# Patient Record
Sex: MALE | Race: WHITE | NOT HISPANIC OR LATINO | URBAN - METROPOLITAN AREA
[De-identification: names, ages, dates, MRNs, and addresses within clinical notes are randomized per-mention and may not be internally consistent; named-entity substitution may affect disease eponyms.]

---

## 2023-04-12 ENCOUNTER — TELEMEDICINE (OUTPATIENT)
Dept: PRIMARY CARE | Facility: CLINIC | Age: 28
End: 2023-04-12
Payer: COMMERCIAL

## 2023-04-12 DIAGNOSIS — U07.1 COVID-19 VIRUS INFECTION: Primary | ICD-10-CM

## 2023-04-12 DIAGNOSIS — R05.1 ACUTE COUGH: ICD-10-CM

## 2023-04-12 DIAGNOSIS — J01.90 ACUTE SINUSITIS, RECURRENCE NOT SPECIFIED, UNSPECIFIED LOCATION: ICD-10-CM

## 2023-04-12 PROCEDURE — 99442 PR PHYS/QHP TELEPHONE EVALUATION 11-20 MIN: CPT | Performed by: INTERNAL MEDICINE

## 2023-04-12 NOTE — PROGRESS NOTES
Subjective   Patient ID: Royce Sullivan is a 28 y.o. male who presents for No chief complaint on file..    HPI patient initiated telehealth visit this visit was completed via telephone secondary to the restrictions of the COVID-19 pandemic all medical issues were addressed and discussed with patient patient was not otherwise examined if it was felt that the patient needed to be seen in the office they would have been referred to do so patient verbally consented to visit  Sick visit had tested positive for COVID today after 1 to 2 days of symptoms consisting of Mm aches and fever (100-101 dF) Father had covid over the past several days and before patient knew that he had spent time with him  No cp, sob no side effect with medication    Past medical history Allergic rhinitis    Medication Finesteride lexapro minoxidil    Allergies nkda      Review of Systems    Objective   There were no vitals taken for this visit.    Physical Exam alert and oriented x3 breathing unlabored not otherwise examined    Assessment/Plan  impression covid sinus cough  Plan tylenol may be used for fever suppression okay for sinus medications such as Zyrtec if needed or cough syrup or lozenges for the cough continue with other medications as needed good nutrition proper rest okay for Paxlovid 3 tablets in the morning 3 tablets in the evening take as directed see EMR Increased hydration 5 days of masking 5 days of isolation prior and recheck if no better and for regular visit

## 2023-10-14 PROBLEM — R63.4 WEIGHT LOSS: Status: ACTIVE | Noted: 2023-10-14

## 2023-10-14 PROBLEM — D23.72 OTHER BENIGN NEOPLASM OF SKIN OF LEFT LOWER LIMB, INCLUDING HIP: Status: ACTIVE | Noted: 2023-08-01

## 2023-10-14 PROBLEM — G93.5 CHIARI I MALFORMATION (MULTI): Status: ACTIVE | Noted: 2023-10-14

## 2023-10-14 PROBLEM — R79.89 ELEVATED LIVER FUNCTION TESTS: Status: ACTIVE | Noted: 2023-10-14

## 2023-10-14 PROBLEM — F41.8 DEPRESSION WITH ANXIETY: Status: ACTIVE | Noted: 2023-10-14

## 2023-10-14 PROBLEM — Z86.69 HISTORY OF CHIARI MALFORMATION: Status: ACTIVE | Noted: 2023-10-14

## 2023-10-14 PROBLEM — R06.00 DYSPNEA: Status: ACTIVE | Noted: 2023-10-14

## 2023-10-14 PROBLEM — L21.8 OTHER SEBORRHEIC DERMATITIS: Status: ACTIVE | Noted: 2023-08-01

## 2023-10-14 PROBLEM — L30.9 DERMATITIS, UNSPECIFIED: Status: ACTIVE | Noted: 2023-08-01

## 2023-10-14 PROBLEM — Q05.9 MENINGOCELE (MULTI): Status: ACTIVE | Noted: 2023-10-14

## 2023-10-14 PROBLEM — F41.9 ANXIETY: Status: ACTIVE | Noted: 2023-10-14

## 2023-10-14 PROBLEM — L20.89 OTHER ATOPIC DERMATITIS: Status: ACTIVE | Noted: 2023-08-01

## 2023-10-14 PROBLEM — L28.1 PRURIGO NODULARIS: Status: ACTIVE | Noted: 2023-08-01

## 2023-10-14 PROBLEM — R00.2 PALPITATIONS: Status: ACTIVE | Noted: 2023-10-14

## 2023-10-14 PROBLEM — E78.49 ESSENTIAL FAMILIAL HYPERLIPIDEMIA: Status: ACTIVE | Noted: 2023-10-14

## 2023-10-14 PROBLEM — H54.7 VISUAL PROBLEMS: Status: ACTIVE | Noted: 2023-10-14

## 2023-10-14 PROBLEM — B07.0 PLANTAR WART: Status: ACTIVE | Noted: 2023-08-01

## 2023-10-14 PROBLEM — H52.13 BILATERAL MYOPIA: Status: ACTIVE | Noted: 2023-10-14

## 2023-10-14 PROBLEM — D48.5 NEOPLASM OF UNCERTAIN BEHAVIOR OF SKIN: Status: ACTIVE | Noted: 2023-08-01

## 2023-10-14 PROBLEM — D38.5: Status: ACTIVE | Noted: 2023-10-14

## 2023-10-14 PROBLEM — G47.00 INSOMNIA: Status: ACTIVE | Noted: 2023-10-14

## 2023-10-14 PROBLEM — L25.5 UNSPECIFIED CONTACT DERMATITIS DUE TO PLANTS, EXCEPT FOOD: Status: ACTIVE | Noted: 2023-08-01

## 2023-10-14 PROBLEM — J34.1 CYST OF NASAL SINUS: Status: ACTIVE | Noted: 2023-10-14

## 2023-10-14 PROBLEM — L65.9 ALOPECIA: Status: ACTIVE | Noted: 2023-10-14

## 2023-10-14 PROBLEM — R55 SYNCOPE AND COLLAPSE: Status: ACTIVE | Noted: 2023-10-14

## 2023-10-14 PROBLEM — R21 RASH AND OTHER NONSPECIFIC SKIN ERUPTION: Status: ACTIVE | Noted: 2023-08-01

## 2023-10-14 PROBLEM — R20.0 NUMBNESS OF LEFT HAND: Status: ACTIVE | Noted: 2023-10-14

## 2023-10-14 PROBLEM — R03.0 BLOOD PRESSURE ELEVATED WITHOUT HISTORY OF HTN: Status: ACTIVE | Noted: 2023-10-14

## 2023-10-14 PROBLEM — R07.89 CHEST WALL PAIN: Status: ACTIVE | Noted: 2023-10-14

## 2023-10-14 PROBLEM — R63.8: Status: ACTIVE | Noted: 2023-10-14

## 2023-10-14 PROBLEM — R79.89 LOW VITAMIN D LEVEL: Status: ACTIVE | Noted: 2023-10-14

## 2023-10-14 PROBLEM — L65.9 NONSCARRING HAIR LOSS, UNSPECIFIED: Status: ACTIVE | Noted: 2023-08-01

## 2023-10-14 PROBLEM — G44.209 TENSION HEADACHE: Status: ACTIVE | Noted: 2023-10-14

## 2023-10-14 RX ORDER — ESCITALOPRAM OXALATE 10 MG/1
TABLET ORAL
COMMUNITY
Start: 2021-01-21

## 2023-10-14 RX ORDER — CLOBETASOL PROPIONATE 0.5 MG/G
1 CREAM TOPICAL
COMMUNITY
Start: 2018-04-20

## 2023-10-14 RX ORDER — CLONIDINE 0.1 MG/24H
PATCH, EXTENDED RELEASE TRANSDERMAL
COMMUNITY

## 2023-10-14 RX ORDER — BETAMETHASONE DIPROPIONATE 0.5 MG/ML
1 LOTION, AUGMENTED TOPICAL
COMMUNITY
Start: 2022-09-20

## 2023-10-14 RX ORDER — MINOXIDIL 2.5 MG/1
0.5 TABLET ORAL DAILY
COMMUNITY
Start: 2023-03-24 | End: 2023-10-17

## 2023-10-14 RX ORDER — FINASTERIDE 1 MG/1
1 TABLET, FILM COATED ORAL
COMMUNITY
Start: 2022-03-22

## 2023-10-14 RX ORDER — BETAMETHASONE VALERATE 1 MG/ML
1 LOTION CUTANEOUS
COMMUNITY
Start: 2020-02-28

## 2023-10-14 RX ORDER — PROPRANOLOL HYDROCHLORIDE 20 MG/1
1 TABLET ORAL 2 TIMES DAILY
COMMUNITY
Start: 2020-10-30

## 2023-10-14 RX ORDER — DEXTROAMPHETAMINE SACCHARATE, AMPHETAMINE ASPARTATE, DEXTROAMPHETAMINE SULFATE AND AMPHETAMINE SULFATE 1.25; 1.25; 1.25; 1.25 MG/1; MG/1; MG/1; MG/1
1 TABLET ORAL EVERY MORNING
COMMUNITY
Start: 2023-02-24

## 2023-10-14 RX ORDER — TRIAMCINOLONE ACETONIDE 1 MG/G
1 CREAM TOPICAL
COMMUNITY
Start: 2016-06-03

## 2023-10-14 RX ORDER — PROPRANOLOL HYDROCHLORIDE 10 MG/1
TABLET ORAL
COMMUNITY

## 2023-10-14 RX ORDER — ESCITALOPRAM OXALATE 20 MG/1
20 TABLET ORAL EVERY MORNING
COMMUNITY
Start: 2023-03-24

## 2023-10-14 RX ORDER — MINOCYCLINE HYDROCHLORIDE 50 MG/1
CAPSULE ORAL
COMMUNITY
Start: 2022-09-20

## 2023-10-14 RX ORDER — ESCITALOPRAM OXALATE 5 MG/1
TABLET ORAL
COMMUNITY

## 2023-10-14 RX ORDER — CLONIDINE HYDROCHLORIDE 0.1 MG/1
TABLET ORAL
COMMUNITY
Start: 2023-03-24

## 2023-10-17 ENCOUNTER — OFFICE VISIT (OUTPATIENT)
Dept: DERMATOLOGY | Facility: CLINIC | Age: 28
End: 2023-10-17
Payer: COMMERCIAL

## 2023-10-17 DIAGNOSIS — L64.9 ANDROGENIC ALOPECIA: Primary | ICD-10-CM

## 2023-10-17 DIAGNOSIS — L20.81 ATOPIC NEURODERMATITIS: ICD-10-CM

## 2023-10-17 PROCEDURE — 99214 OFFICE O/P EST MOD 30 MIN: CPT | Performed by: STUDENT IN AN ORGANIZED HEALTH CARE EDUCATION/TRAINING PROGRAM

## 2023-10-17 RX ORDER — MINOXIDIL 2.5 MG/1
2.5 TABLET ORAL DAILY
Qty: 90 EACH | Refills: 3 | Status: SHIPPED | OUTPATIENT
Start: 2023-10-17 | End: 2024-10-16

## 2023-10-17 RX ORDER — TRIAMCINOLONE ACETONIDE 1 MG/G
OINTMENT TOPICAL 2 TIMES DAILY
Qty: 453.6 G | Refills: 1 | Status: SHIPPED | OUTPATIENT
Start: 2023-10-17 | End: 2024-10-16

## 2023-10-17 RX ORDER — CLOBETASOL PROPIONATE 0.5 MG/G
OINTMENT TOPICAL 2 TIMES DAILY
Qty: 60 G | Refills: 3 | Status: SHIPPED | OUTPATIENT
Start: 2023-10-17 | End: 2023-10-31

## 2023-10-17 NOTE — PROGRESS NOTES
Subjective     Royce Sullivan is a 28 y.o. male who presents for the following: Alopecia (Tx: Minoxidil and Finasteride ) and Eczema (Arms & Hands.).   1) Alopecia currently on 1mg finasteride and 1.25mg minoxidil. Has noticed mild regrowth on scalp but not much. No beard growth  2) Eczema. Started after poison ivy a few years ago. Primarily on hands and ventral forearms. Does not moisturize.     Review of Systems:  No other skin or systemic complaints other than what is documented elsewhere in the note.    The following portions of the chart were reviewed this encounter and updated as appropriate:          Skin Cancer History  No skin cancer on file.      Specialty Problems          Dermatology Problems    Dermatitis, unspecified    Neoplasm of uncertain behavior of skin    Nonscarring hair loss, unspecified    Other atopic dermatitis    Other benign neoplasm of skin of left lower limb, including hip    Other seborrheic dermatitis    Plantar wart    Prurigo nodularis    Rash and other nonspecific skin eruption    Unspecified contact dermatitis due to plants, except food    Alopecia        Objective   Well appearing patient in no apparent distress; mood and affect are within normal limits.    A focused skin examination of the head and upper extremities was performed. All findings within normal limits unless otherwise noted below.    Assessment/Plan   1. Androgenic alopecia  Scalp  Thinning and miniaturization of terminal hairs on the scalp in a male pattern.     Androgenetic Alopecia - scalp. Currently on 1.25mg minoxidil as well as 1mg finasteride daily. Has had a little improvement over the last 10 months but otherwise has not seen much improvement. The benign, but chronic and progressive nature of this condition and treatment options were discussed extensively with the patient in the office today and reassurance provided.  I spent a great deal of time discussing various treatment options, including topical  therapy, specifically with over-the-counter Rogaine, and systemic therapy, specifically with oral Propecia and Oral minoxidil.  After discussing the risks, benefits, and side effects of each of these options at length, the patient expressed understanding and wishes to continue with therapy. Continue finasteride 1mg, INCREASE minoxidil from 1.25 to 2.5mg daily. The risks, benefits, and side effects of this medication, including possible cardiac tamponade (very rare but life threatening), hypertrichosis and the risk of losing any hair re-growth the patient may achieve while using the product should the patient choose to discontinue its use abruptly in the future, were discussed.     Patient was curious and was wondering whether there was any genetic testing to assess minoxidil response (father is a ), after review of the literature there are mentions of [id number] gene testing to assess minoxidil response as this is an enzyme expressed in hair follicle cells, but as far as I can tell, this has not been rigorously clinically validated. In addition, results would not change our clinical management of his hair loss.     RTC 7-8 months to assess improvement, call if any issues before then.     minoxidil (Loniten) 2.5 mg tablet - Scalp  Take 1 tablet (2.5 mg) by mouth once daily.    Related Procedures  Follow Up In Dermatology - Established Patient    2. Atopic neurodermatitis  Erythematous scaly papules and plaques with overyling excoriation located symmetrically in the antecubital and popliteal fossae.     Eczema - hands, ventral forearms. Combination of eczema and dyshidrotic eczema. The potentially chronic and intermittently flaring nature of this condition and treatment options were discussed extensively with the patient today. At this time, I recommend topical steroid therapy with triamcinolone ointment for the arms and clobetasol for the hands, which the patient was instructed to apply twice daily to the  affected areas on the arms and hands, respectively for the next 2 weeks, followed by taper to twice daily on weekends only for persistent areas; the patient may repeat treatment in a 2-week burst-and-taper fashion every 6-8 weeks as needed for future flares.  I also emphasized the importance of dry, sensitive skin care, including the use of a mild soap, such as Dove, and frequent and aggressive moisturization, at least twice daily and immediately following showers or baths, with recommended over-the-counter moisturizing creams, such as Eucerin, Cetaphil, Cerave, or Aveeno, or Vaseline or Aquaphor ointments.  The risks, benefits, and side effects of this medication, including possible skin atrophy with overuse of topical steroids, were discussed.  The patient expressed understanding and is in agreement with this plan.     Had length discussion re: moisturization and avoidance of irritants on the forearms as rash is primarily on ventral forearms.     triamcinolone (Kenalog) 0.1 % ointment  Apply topically 2 times a day. As needed for eczema. Use for up to 2-3 weeks at a time, then take a break for 1-2 weeks. Repeat as needed    clobetasol (Temovate) 0.05 % ointment  Apply topically 2 times a day for 14 days. Use for up to 2-3 weeks at a time, then take a break for 1-2 weeks. Repeat as needed    Related Procedures  Follow Up In Dermatology - Established Patient

## 2023-11-21 DIAGNOSIS — L64.9 ANDROGENIC ALOPECIA: ICD-10-CM

## 2023-11-21 RX ORDER — FINASTERIDE 5 MG/1
TABLET, FILM COATED ORAL
Qty: 7 TABLET | Refills: 6 | Status: SHIPPED | OUTPATIENT
Start: 2023-11-21 | End: 2023-12-21

## 2023-11-21 NOTE — TELEPHONE ENCOUNTER
Patient called asking if Dr. Varghsee could change his prescription from finasteride to Proscar due to cost. Patient is currently taking 1 mg finasteride but would like to know if he can cut Proscar into fourths and take 1.25mg daily.

## 2023-12-15 NOTE — PROGRESS NOTES
I was present during all key portions of visit including history, exam, discussion/plan and/or procedures and directly supervised our resident during all portions of the visit, follow up care, medications and more    MD Flash Casper MD

## 2023-12-17 DIAGNOSIS — L64.9 ANDROGENIC ALOPECIA: ICD-10-CM

## 2023-12-21 RX ORDER — FINASTERIDE 5 MG/1
TABLET, FILM COATED ORAL
Qty: 22 TABLET | Refills: 2 | Status: SHIPPED | OUTPATIENT
Start: 2023-12-21

## 2024-01-17 ENCOUNTER — APPOINTMENT (OUTPATIENT)
Dept: DERMATOLOGY | Facility: CLINIC | Age: 29
End: 2024-01-17
Payer: COMMERCIAL

## 2024-02-07 ENCOUNTER — LAB (OUTPATIENT)
Dept: LAB | Facility: LAB | Age: 29
End: 2024-02-07
Payer: COMMERCIAL

## 2024-02-07 ENCOUNTER — OFFICE VISIT (OUTPATIENT)
Dept: PRIMARY CARE | Facility: CLINIC | Age: 29
End: 2024-02-07
Payer: COMMERCIAL

## 2024-02-07 VITALS — DIASTOLIC BLOOD PRESSURE: 71 MMHG | WEIGHT: 181 LBS | SYSTOLIC BLOOD PRESSURE: 121 MMHG | BODY MASS INDEX: 30.41 KG/M2

## 2024-02-07 DIAGNOSIS — E78.49 ESSENTIAL FAMILIAL HYPERLIPIDEMIA: ICD-10-CM

## 2024-02-07 DIAGNOSIS — Z00.00 HEALTH CARE MAINTENANCE: ICD-10-CM

## 2024-02-07 DIAGNOSIS — Z00.00 HEALTH CARE MAINTENANCE: Primary | ICD-10-CM

## 2024-02-07 LAB
ALBUMIN SERPL BCP-MCNC: 4.9 G/DL (ref 3.4–5)
ALP SERPL-CCNC: 72 U/L (ref 33–120)
ALT SERPL W P-5'-P-CCNC: 46 U/L (ref 10–52)
ANION GAP SERPL CALC-SCNC: 14 MMOL/L (ref 10–20)
AST SERPL W P-5'-P-CCNC: 29 U/L (ref 9–39)
BILIRUB SERPL-MCNC: 0.9 MG/DL (ref 0–1.2)
BUN SERPL-MCNC: 7 MG/DL (ref 6–23)
CALCIUM SERPL-MCNC: 9.9 MG/DL (ref 8.6–10.6)
CHLORIDE SERPL-SCNC: 102 MMOL/L (ref 98–107)
CHOLEST SERPL-MCNC: 276 MG/DL (ref 0–199)
CHOLESTEROL/HDL RATIO: 7.8
CO2 SERPL-SCNC: 28 MMOL/L (ref 21–32)
CREAT SERPL-MCNC: 0.72 MG/DL (ref 0.5–1.3)
EGFRCR SERPLBLD CKD-EPI 2021: >90 ML/MIN/1.73M*2
ERYTHROCYTE [DISTWIDTH] IN BLOOD BY AUTOMATED COUNT: 11 % (ref 11.5–14.5)
GLUCOSE SERPL-MCNC: 77 MG/DL (ref 74–99)
HCT VFR BLD AUTO: 44.8 % (ref 41–52)
HDLC SERPL-MCNC: 35.5 MG/DL
HGB BLD-MCNC: 15.2 G/DL (ref 13.5–17.5)
LDLC SERPL CALC-MCNC: 195 MG/DL
MCH RBC QN AUTO: 31.7 PG (ref 26–34)
MCHC RBC AUTO-ENTMCNC: 33.9 G/DL (ref 32–36)
MCV RBC AUTO: 93 FL (ref 80–100)
NON HDL CHOLESTEROL: 241 MG/DL (ref 0–149)
NRBC BLD-RTO: 0 /100 WBCS (ref 0–0)
PLATELET # BLD AUTO: 307 X10*3/UL (ref 150–450)
POTASSIUM SERPL-SCNC: 4.6 MMOL/L (ref 3.5–5.3)
PROT SERPL-MCNC: 6.7 G/DL (ref 6.4–8.2)
RBC # BLD AUTO: 4.8 X10*6/UL (ref 4.5–5.9)
SODIUM SERPL-SCNC: 139 MMOL/L (ref 136–145)
TRIGL SERPL-MCNC: 226 MG/DL (ref 0–149)
VLDL: 45 MG/DL (ref 0–40)
WBC # BLD AUTO: 7.8 X10*3/UL (ref 4.4–11.3)

## 2024-02-07 PROCEDURE — 85027 COMPLETE CBC AUTOMATED: CPT

## 2024-02-07 PROCEDURE — 81241 F5 GENE: CPT

## 2024-02-07 PROCEDURE — 99395 PREV VISIT EST AGE 18-39: CPT | Performed by: INTERNAL MEDICINE

## 2024-02-07 PROCEDURE — 80061 LIPID PANEL: CPT

## 2024-02-07 PROCEDURE — 36415 COLL VENOUS BLD VENIPUNCTURE: CPT

## 2024-02-07 PROCEDURE — 80053 COMPREHEN METABOLIC PANEL: CPT

## 2024-02-07 NOTE — PROGRESS NOTES
Subjective   Patient ID: Royce Sullivan is a 28 y.o. male who presents for No chief complaint on file..    HPI CPE see updated front sheet have not seen him for a while has been on and off with drinking water use no tobacco has a part-time job seemingly doing better from that standpoint no chest pain no shortness of breath bowels normal no dysuria bones muscles joints okay    Past medical history hyperlipidemia    Medications Lexapro Adderall 5 mg twice daily minoxidil finasteride    Allergies noted and unchanged    Social history no tobacco alcohol      Family history Sister with factor V Leiden and DVT PE father hyperlipidemia on Repatha    Prevention limited exercise some prior evaluations reviewed    Review of Systems    Objective   /71   Wt 82.1 kg (181 lb)   BMI 30.41 kg/m²     Physical Exam  Vital signs noted alert and oriented x 3 NCAT PERRLA EOMI nares without discharge OP benign TM normal bilateral EAC clear bilateral no AC nodes no JVD or bruit no thyromegaly chest clear to auscultation CV regular rate and rhythm S1-S2 abdomen soft nontender normal active bowel sounds no rebound or guarding no HSM LS spine normal curvature negative straight leg raise negative logroll negative SI joint tenderness extremities no clubbing cyanosis or edema normal distal pulses DTR 2+  Assessment/Plan    impression General medical examination hyperlipidemia family history of clotting disorder other diagnoses  Prevention counseling + close potassium kidney function advised on blood sugar and good diet regular exercise increase water consumption continue with medications with following up with psychiatry follow-up with dermatology for the hair check hepatic panel advised on liver enzymes cessation of all alcohol tobacco check lipid panel advised on cholesterol profile check CBC advised on blood count check factor V Leiden level and advise that he recheck based on above more regularly

## 2024-02-12 LAB
ELECTRONICALLY SIGNED BY: ABNORMAL
F5 P.R506Q BLD/T QL: ABNORMAL
F5 P.R506Q BLD/T QL: ABNORMAL

## 2024-06-05 ENCOUNTER — OFFICE VISIT (OUTPATIENT)
Dept: PRIMARY CARE | Facility: CLINIC | Age: 29
End: 2024-06-05
Payer: COMMERCIAL

## 2024-06-05 VITALS — RESPIRATION RATE: 12 BRPM | HEART RATE: 72 BPM

## 2024-06-05 DIAGNOSIS — D68.51 HETEROZYGOUS FACTOR V LEIDEN MUTATION (MULTI): ICD-10-CM

## 2024-06-05 DIAGNOSIS — N48.89 PENILE PAPULES: ICD-10-CM

## 2024-06-05 DIAGNOSIS — N47.1 PHIMOSIS: ICD-10-CM

## 2024-06-05 DIAGNOSIS — E78.49 ESSENTIAL FAMILIAL HYPERLIPIDEMIA: Primary | ICD-10-CM

## 2024-06-05 PROCEDURE — 99213 OFFICE O/P EST LOW 20 MIN: CPT | Performed by: INTERNAL MEDICINE

## 2024-06-05 NOTE — PROGRESS NOTES
Patient Subjective   Patient ID: Royce Sullivan is a 29 y.o. male who presents for No chief complaint on file..    HPI follow-up visit and sick visit no chest pain no shortness of breath but has had further on and off for 2 months some changes on the penile skin sometimes minimally swollen and sometimes papulous just behind the penile head at the level of the foreskin and the scrotum was normal no history of hernia or injury there prior blood work reviewed    Review of Systems    Objective   There were no vitals taken for this visit.    Physical Exam vital signs noted alert and oriented x 3 NCAT no JVD chest clear to auscultation CV regular rate and rhythm S1-S2 extremities no clubbing cyanosis or edema normal distal pulses  normal penis normal descended testicles no inguinal hernia no lymphadenopathy just behind the penile head there is remnants of perhaps some Candida no other findings    Assessment/Plan    impression phimosis?  Candida penile papules?  Hyperlipidemia factor V heterozygote  Plan discussed with the factor V his sister is homozygous for the same himself has not had blood clots discussed with staying well hydrated may use some clotrimazole as needed around the penile head if situation recurs has been losing weight watching diet does not smoke okay for continue to recheck on the lipids and consideration of other therapies in the future recheck at any time

## 2024-06-18 ENCOUNTER — APPOINTMENT (OUTPATIENT)
Dept: DERMATOLOGY | Facility: CLINIC | Age: 29
End: 2024-06-18
Payer: COMMERCIAL

## 2024-06-18 DIAGNOSIS — L20.81 ATOPIC NEURODERMATITIS: ICD-10-CM

## 2024-06-18 DIAGNOSIS — L64.9 ANDROGENIC ALOPECIA: ICD-10-CM

## 2024-06-18 PROCEDURE — 99214 OFFICE O/P EST MOD 30 MIN: CPT | Performed by: STUDENT IN AN ORGANIZED HEALTH CARE EDUCATION/TRAINING PROGRAM

## 2024-06-18 RX ORDER — DUTASTERIDE 0.5 MG/1
0.5 CAPSULE, LIQUID FILLED ORAL DAILY
Qty: 30 CAPSULE | Refills: 11 | Status: SHIPPED | OUTPATIENT
Start: 2024-06-18 | End: 2025-06-18

## 2024-06-18 ASSESSMENT — DERMATOLOGY QUALITY OF LIFE (QOL) ASSESSMENT
RATE HOW EMOTIONALLY BOTHERED YOU ARE BY YOUR SKIN PROBLEM (FOR EXAMPLE, WORRY, EMBARRASSMENT, FRUSTRATION): 0 - NEVER BOTHERED
ARE THERE EXCLUSIONS OR EXCEPTIONS FOR THE QUALITY OF LIFE ASSESSMENT: NO
RATE HOW BOTHERED YOU ARE BY EFFECTS OF YOUR SKIN PROBLEMS ON YOUR ACTIVITIES (EG, GOING OUT, ACCOMPLISHING WHAT YOU WANT, WORK ACTIVITIES OR YOUR RELATIONSHIPS WITH OTHERS): 0 - NEVER BOTHERED
RATE HOW BOTHERED YOU ARE BY SYMPTOMS OF YOUR SKIN PROBLEM (EG, ITCHING, STINGING BURNING, HURTING OR SKIN IRRITATION): 0 - NEVER BOTHERED
DATE THE QUALITY-OF-LIFE ASSESSMENT WAS COMPLETED: 67009

## 2024-06-18 ASSESSMENT — DERMATOLOGY PATIENT ASSESSMENT
HAVE YOU HAD OR DO YOU HAVE A STAPH INFECTION: NO
HAVE YOU HAD OR DO YOU HAVE VASCULAR DISEASE: NO
DO YOU USE A TANNING BED: NO
DO YOU HAVE ANY NEW OR CHANGING LESIONS: NO
ARE YOU AN ORGAN TRANSPLANT RECIPIENT: NO

## 2024-06-18 ASSESSMENT — PATIENT GLOBAL ASSESSMENT (PGA): PATIENT GLOBAL ASSESSMENT: PATIENT GLOBAL ASSESSMENT:  1 - CLEAR

## 2024-06-18 ASSESSMENT — ITCH NUMERIC RATING SCALE: HOW SEVERE IS YOUR ITCHING?: 0

## 2024-06-18 NOTE — PROGRESS NOTES
Subjective     Royce Sullivan is a 29 y.o. male who presents for the following: Follow-up (Hair medication follow up, eczema symptom follow up, patient believes he has small cyst on the back of neck).     Review of Systems:  No other skin or systemic complaints other than what is documented elsewhere in the note.    The following portions of the chart were reviewed this encounter and updated as appropriate:          Skin Cancer History  No skin cancer on file.      Specialty Problems          Dermatology Problems    Dermatitis, unspecified    Neoplasm of uncertain behavior of skin    Nonscarring hair loss, unspecified    Other atopic dermatitis    Other benign neoplasm of skin of left lower limb, including hip    Other seborrheic dermatitis    Plantar wart    Prurigo nodularis    Rash and other nonspecific skin eruption    Unspecified contact dermatitis due to plants, except food    Alopecia        Objective   Well appearing patient in no apparent distress; mood and affect are within normal limits.    A focused skin examination was performed. All findings within normal limits unless otherwise noted below.    Assessment/Plan   1. Androgenic alopecia  Male patterned thinning on exam  Not adequately managed    We will STOP finasteride and start Dutasteride 0.5mg  Continue minoxidil 2.5mg daily    Related Procedures  Follow Up In Dermatology - Established Patient    Related Medications  minoxidil (Loniten) 2.5 mg tablet  Take 1 tablet (2.5 mg) by mouth once daily.    finasteride (Proscar) 5 mg tablet  TAKE 1/4 TABLET BY MOUTH DAILY    2. Atopic neurodermatitis    Clear today    Patient reports zinc and magnesium supplements seemed to have been very helpful for him and he hasn't been needing to use the triamcinolone ointment as much  He stopped drinking alcohol as well    Related Procedures  Follow Up In Dermatology - Established Patient    Related Medications  triamcinolone (Kenalog) 0.1 % ointment  Apply topically 2  times a day. As needed for eczema. Use for up to 2-3 weeks at a time, then take a break for 1-2 weeks. Repeat as needed

## 2024-08-27 ENCOUNTER — APPOINTMENT (OUTPATIENT)
Dept: PRIMARY CARE | Facility: CLINIC | Age: 29
End: 2024-08-27
Payer: COMMERCIAL

## 2024-08-29 ENCOUNTER — OFFICE VISIT (OUTPATIENT)
Dept: PRIMARY CARE | Facility: CLINIC | Age: 29
End: 2024-08-29
Payer: COMMERCIAL

## 2024-08-29 VITALS — BODY MASS INDEX: 28.06 KG/M2 | DIASTOLIC BLOOD PRESSURE: 73 MMHG | SYSTOLIC BLOOD PRESSURE: 120 MMHG | WEIGHT: 167 LBS

## 2024-08-29 DIAGNOSIS — N45.1 EPIDIDYMITIS: ICD-10-CM

## 2024-08-29 DIAGNOSIS — R07.89 CHEST WALL PAIN: ICD-10-CM

## 2024-08-29 DIAGNOSIS — Z00.00 HEALTH CARE MAINTENANCE: Primary | ICD-10-CM

## 2024-08-29 PROCEDURE — 99213 OFFICE O/P EST LOW 20 MIN: CPT | Performed by: INTERNAL MEDICINE

## 2024-08-29 RX ORDER — DOXYCYCLINE 100 MG/1
100 CAPSULE ORAL 2 TIMES DAILY
Qty: 14 CAPSULE | Refills: 0 | Status: SHIPPED | OUTPATIENT
Start: 2024-08-29 | End: 2024-09-05

## 2024-08-29 NOTE — PROGRESS NOTES
Subjective   Patient ID: Royce Sullivan is a 29 y.o. male who presents for No chief complaint on file..    HPI Sick visit had been doing well overall no chest pain no shortness of breath had some left and right sided scrotal area discomfort without injury no particular dysuria no chest pain no shortness of breath but some irritation sometimes when he stretches out his arms across his chest wall with working out bowels normal    Review of Systems    Objective   There were no vitals taken for this visit.    Physical Exam vital signs noted alert and oriented x 3 NCAT no JVD chest clear to auscultation CV regular rate and rhythm S1-S2 abdomen soft nontender normal active bowel sounds chest wall nontender no markings no ecchymoses no scapular or flank tenderness extremities no clubbing cyanosis or edema normal distal pulses  normal penis normal descended testicles there is some swelling in the posterior right scrotum consistent with epididymitis no inguinal node enlargement or hernia    Assessment/Plan impression epididymitis chest wall discomfort/costochondritis  Plan continue with current medication had been operated ultrasound in the past when he had a presumed hydrocele he had had a previous ultrasound when he was a teen of the scrotum there were no findings per him okay for prescription doxycycline 100 mg p.o. twice daily #14 refill 0 he has lost almost 20 pounds by watching his diet and exercising but there is no unusual exercise such as cycling that preceded this   heating pad or Tylenol may be used for the chest wall and follow-up with urology or ultrasound if no better and for regular visit

## 2024-08-30 ENCOUNTER — APPOINTMENT (OUTPATIENT)
Dept: PRIMARY CARE | Facility: CLINIC | Age: 29
End: 2024-08-30
Payer: COMMERCIAL

## 2024-09-06 ENCOUNTER — TELEPHONE (OUTPATIENT)
Dept: PRIMARY CARE | Facility: CLINIC | Age: 29
End: 2024-09-06

## 2024-09-16 ENCOUNTER — TELEPHONE (OUTPATIENT)
Dept: DERMATOLOGY | Facility: CLINIC | Age: 29
End: 2024-09-16
Payer: COMMERCIAL

## 2024-09-16 DIAGNOSIS — L64.9 ANDROGENIC ALOPECIA: ICD-10-CM

## 2024-09-16 NOTE — TELEPHONE ENCOUNTER
Pt called stating he takes 2.5 mg of minoxidil and realized that his prescription has no refills left. He would like [facility] prescript[facility].    He also stated that he takes 0.5 mg of Bustaseride daily, he would like to fill this prescription [facility] ([facility] pharmacy), before he can fill there he needs Dr. Varghese to fill out a form. He would like to fill here due to it saving him a lot of money.     I returned call to patient to let him know that I would have Dr. Varghese send refills and asked him to email us the form for Dr. Varghese to fill out.

## 2024-09-17 RX ORDER — DUTASTERIDE 0.5 MG/1
0.5 CAPSULE, LIQUID FILLED ORAL DAILY
Qty: 90 CAPSULE | Refills: 3 | Status: SHIPPED | OUTPATIENT
Start: 2024-09-17 | End: 2025-09-17

## 2024-09-17 RX ORDER — MINOXIDIL 2.5 MG/1
2.5 TABLET ORAL DAILY
Qty: 90 TABLET | Refills: 3 | Status: SHIPPED | OUTPATIENT
Start: 2024-09-17 | End: 2025-09-17

## 2024-09-17 RX ORDER — DUTASTERIDE 0.5 MG/1
0.5 CAPSULE, LIQUID FILLED ORAL DAILY
Qty: 30 CAPSULE | Refills: 11 | Status: SHIPPED | OUTPATIENT
Start: 2024-09-17 | End: 2024-09-17 | Stop reason: SDUPTHER

## 2024-11-11 ENCOUNTER — TELEPHONE (OUTPATIENT)
Dept: PRIMARY CARE | Facility: CLINIC | Age: 29
End: 2024-11-11

## 2024-11-15 ENCOUNTER — OFFICE VISIT (OUTPATIENT)
Dept: PRIMARY CARE | Facility: CLINIC | Age: 29
End: 2024-11-15
Payer: COMMERCIAL

## 2024-11-15 VITALS — HEART RATE: 72 BPM | RESPIRATION RATE: 12 BRPM

## 2024-11-15 DIAGNOSIS — M53.3 PAIN OF LEFT SACROILIAC JOINT: ICD-10-CM

## 2024-11-15 DIAGNOSIS — M53.3 SACROILIAC JOINT DISEASE: ICD-10-CM

## 2024-11-15 DIAGNOSIS — N50.89 SCROTAL FULLNESS: Primary | ICD-10-CM

## 2024-11-15 PROCEDURE — 99213 OFFICE O/P EST LOW 20 MIN: CPT | Performed by: INTERNAL MEDICINE

## 2024-11-15 NOTE — PROGRESS NOTES
Subjective   Patient ID: Royce Sullivan is a 29 y.o. male who presents for Back Pain.    HPI Follow-up visit no chest pain no shortness of breath no side effect with medication in the past has had a lumbar spine MRI because he had some disc bulge and some lower extremity issues now with some sacroiliac type discomfort he has been doing some exercises like to get back into different exercises been working on diet exercise and weight loss for his cholesterol also after his antibiotics still felt something in the scrotum would like to have the ultrasound     vital signs noted alert and oriented x 3 NCAT no JVD chest clear to auscultation CV regular rate and rhythm S1-S2 LS spine normal curvature to straightened nontender spinous process negative straight leg raise negative logroll positive bilateral sacroiliitis left greater than right extremities no clubbing cyanosis or edema normal distal pulses  deferred  Review of Systems    Objective   There were no vitals taken for this visit.  Physical Exam    Assessment/Plan    impression scrotum sacroiliitis  Plan okay for sports medicine his preference more so than physical therapy requisition made but may use Aleve roll-on in the meantime stretches demonstrated good hydration okay for scrotal ultrasound requisition made based on previous epididymitis and still feeling of fullness there and then follow-up with urology as needed and for regular visit physical examination check on blood work and lipids at the beginning of 2025        Yes - the patient is able to be screened

## 2024-12-09 ENCOUNTER — APPOINTMENT (OUTPATIENT)
Dept: ORTHOPEDIC SURGERY | Facility: CLINIC | Age: 29
End: 2024-12-09
Payer: COMMERCIAL

## 2024-12-17 ENCOUNTER — APPOINTMENT (OUTPATIENT)
Dept: ORTHOPEDIC SURGERY | Facility: CLINIC | Age: 29
End: 2024-12-17
Payer: COMMERCIAL

## 2025-01-08 ENCOUNTER — APPOINTMENT (OUTPATIENT)
Dept: OPHTHALMOLOGY | Facility: CLINIC | Age: 30
End: 2025-01-08
Payer: COMMERCIAL

## 2025-03-03 ENCOUNTER — APPOINTMENT (OUTPATIENT)
Dept: RADIOLOGY | Facility: HOSPITAL | Age: 30
End: 2025-03-03
Payer: COMMERCIAL

## 2025-03-14 ENCOUNTER — HOSPITAL ENCOUNTER (OUTPATIENT)
Dept: RADIOLOGY | Facility: HOSPITAL | Age: 30
Discharge: HOME | End: 2025-03-14
Payer: COMMERCIAL

## 2025-03-14 DIAGNOSIS — N50.89 SCROTAL FULLNESS: ICD-10-CM

## 2025-03-14 PROCEDURE — 93975 VASCULAR STUDY: CPT

## 2025-04-28 ENCOUNTER — TELEPHONE (OUTPATIENT)
Dept: PRIMARY CARE | Facility: CLINIC | Age: 30
End: 2025-04-28

## 2025-04-29 ENCOUNTER — APPOINTMENT (OUTPATIENT)
Dept: DERMATOLOGY | Facility: CLINIC | Age: 30
End: 2025-04-29
Payer: COMMERCIAL

## 2025-04-29 DIAGNOSIS — L20.81 ATOPIC NEURODERMATITIS: ICD-10-CM

## 2025-04-29 DIAGNOSIS — L64.9 ANDROGENIC ALOPECIA: Primary | ICD-10-CM

## 2025-04-29 PROCEDURE — 99214 OFFICE O/P EST MOD 30 MIN: CPT | Performed by: STUDENT IN AN ORGANIZED HEALTH CARE EDUCATION/TRAINING PROGRAM

## 2025-04-29 ASSESSMENT — PATIENT GLOBAL ASSESSMENT (PGA): PATIENT GLOBAL ASSESSMENT: PATIENT GLOBAL ASSESSMENT:  2 - MILD

## 2025-04-29 ASSESSMENT — DERMATOLOGY QUALITY OF LIFE (QOL) ASSESSMENT
RATE HOW BOTHERED YOU ARE BY EFFECTS OF YOUR SKIN PROBLEMS ON YOUR ACTIVITIES (EG, GOING OUT, ACCOMPLISHING WHAT YOU WANT, WORK ACTIVITIES OR YOUR RELATIONSHIPS WITH OTHERS): 1
RATE HOW EMOTIONALLY BOTHERED YOU ARE BY YOUR SKIN PROBLEM (FOR EXAMPLE, WORRY, EMBARRASSMENT, FRUSTRATION): 1
WHAT SINGLE SKIN CONDITION LISTED BELOW IS THE PATIENT ANSWERING THE QUALITY-OF-LIFE ASSESSMENT QUESTIONS ABOUT: DERMATITIS
DATE THE QUALITY-OF-LIFE ASSESSMENT WAS COMPLETED: 67324
ARE THERE EXCLUSIONS OR EXCEPTIONS FOR THE QUALITY OF LIFE ASSESSMENT: NO
RATE HOW BOTHERED YOU ARE BY SYMPTOMS OF YOUR SKIN PROBLEM (EG, ITCHING, STINGING BURNING, HURTING OR SKIN IRRITATION): 1

## 2025-04-29 ASSESSMENT — ITCH NUMERIC RATING SCALE: HOW SEVERE IS YOUR ITCHING?: 1

## 2025-04-29 ASSESSMENT — DERMATOLOGY PATIENT ASSESSMENT: DO YOU HAVE ANY NEW OR CHANGING LESIONS: NO

## 2025-04-29 NOTE — Clinical Note
Androgenetic Alopecia - scalp.     Continue minoxidil 2.5mgPO daily  Continue dutasteride 0.5mg daily    Improved!  Denies any sexual or mood related adverse effects

## 2025-04-29 NOTE — PROGRESS NOTES
Subjective     Royce Sullivan is a 30 y.o. male who presents for the following: Alopecia (Androgenic alopecia/) and Eczema (Bilat Hands & Rt Knee).     Review of Systems:  No other skin or systemic complaints other than what is documented elsewhere in the note.    The following portions of the chart were reviewed this encounter and updated as appropriate:          Skin Cancer History  Biopsy Log Book  No skin cancers from Specimen Tracking.    Additional History      Specialty Problems          Dermatology Problems    Dermatitis, unspecified    Neoplasm of uncertain behavior of skin    Nonscarring hair loss, unspecified    Other atopic dermatitis    Other benign neoplasm of skin of left lower limb, including hip    Other seborrheic dermatitis    Plantar wart    Prurigo nodularis    Rash and other nonspecific skin eruption    Unspecified contact dermatitis due to plants, except food    Alopecia        Objective   Well appearing patient in no apparent distress; mood and affect are within normal limits.    A focused skin examination was performed. All findings within normal limits unless otherwise noted below.    Assessment/Plan   Skin Exam  1. ANDROGENIC ALOPECIA  Scalp  Thinning and miniaturization of terminal hairs on the scalp in a male pattern.   Androgenetic Alopecia - scalp.     Continue minoxidil 2.5mgPO daily  Continue dutasteride 0.5mg daily    Improved!  Denies any sexual or mood related adverse effects  Related Medications  minoxidil (Loniten) 2.5 mg tablet  Take 1 tablet (2.5 mg) by mouth once daily.  dutasteride (Avodart) 0.5 mg capsule  Take 1 capsule (0.5 mg) by mouth once daily.  2. ATOPIC NEURODERMATITIS  Generalized  Annular eczematous patches with pinpoint pustules and scaling bilaterally on palms  Continue triamcinolone 0.1% ointment every few days if needed on hands/arms/legs

## 2025-05-21 ENCOUNTER — APPOINTMENT (OUTPATIENT)
Dept: PRIMARY CARE | Facility: CLINIC | Age: 30
End: 2025-05-21
Payer: COMMERCIAL

## 2025-05-21 VITALS
HEIGHT: 64 IN | HEART RATE: 93 BPM | WEIGHT: 160 LBS | BODY MASS INDEX: 27.31 KG/M2 | SYSTOLIC BLOOD PRESSURE: 131 MMHG | DIASTOLIC BLOOD PRESSURE: 76 MMHG | OXYGEN SATURATION: 96 %

## 2025-05-21 DIAGNOSIS — E78.49 ESSENTIAL FAMILIAL HYPERLIPIDEMIA: ICD-10-CM

## 2025-05-21 DIAGNOSIS — Z00.00 HEALTH CARE MAINTENANCE: Primary | ICD-10-CM

## 2025-05-21 DIAGNOSIS — R53.83 OTHER FATIGUE: ICD-10-CM

## 2025-05-21 DIAGNOSIS — M53.3 SACROILIAC JOINT DISEASE: ICD-10-CM

## 2025-05-21 PROCEDURE — 3008F BODY MASS INDEX DOCD: CPT | Performed by: INTERNAL MEDICINE

## 2025-05-21 PROCEDURE — 99395 PREV VISIT EST AGE 18-39: CPT | Performed by: INTERNAL MEDICINE

## 2025-05-21 ASSESSMENT — COLUMBIA-SUICIDE SEVERITY RATING SCALE - C-SSRS
2. HAVE YOU ACTUALLY HAD ANY THOUGHTS OF KILLING YOURSELF?: NO
1. IN THE PAST MONTH, HAVE YOU WISHED YOU WERE DEAD OR WISHED YOU COULD GO TO SLEEP AND NOT WAKE UP?: NO
6. HAVE YOU EVER DONE ANYTHING, STARTED TO DO ANYTHING, OR PREPARED TO DO ANYTHING TO END YOUR LIFE?: NO

## 2025-05-21 ASSESSMENT — PATIENT HEALTH QUESTIONNAIRE - PHQ9
2. FEELING DOWN, DEPRESSED OR HOPELESS: NOT AT ALL
1. LITTLE INTEREST OR PLEASURE IN DOING THINGS: NOT AT ALL
SUM OF ALL RESPONSES TO PHQ9 QUESTIONS 1 AND 2: 0

## 2025-05-21 ASSESSMENT — ENCOUNTER SYMPTOMS
LOSS OF SENSATION IN FEET: 0
DEPRESSION: 0
OCCASIONAL FEELINGS OF UNSTEADINESS: 0

## 2025-05-21 ASSESSMENT — PROMIS GLOBAL HEALTH SCALE
RATE_QUALITY_OF_LIFE: GOOD
RATE_SOCIAL_SATISFACTION: POOR
RATE_AVERAGE_PAIN: 5
RATE_GENERAL_HEALTH: GOOD
RATE_PHYSICAL_HEALTH: GOOD
CARRYOUT_PHYSICAL_ACTIVITIES: MODERATELY
EMOTIONAL_PROBLEMS: OFTEN
RATE_MENTAL_HEALTH: GOOD
RATE_AVERAGE_FATIGUE: MODERATE
CARRYOUT_SOCIAL_ACTIVITIES: FAIR

## 2025-05-21 NOTE — PROGRESS NOTES
"Subjective   Patient ID: Royce Sullivan is a 30 y.o. male who presents for Annual Exam (Pt is here for annual exam ).    HPI CPE see updated front sheet No chest pain no shortness of breath no side effect with medication now on the Lexapro and tapering off and also on the Adderall and feels like he needs less of that than before was unable to get to physical therapy or orthopedics for sacroiliac joint disease    Past medical history hyperlipidemia sacroiliitis    Medications Lexapro Adderall 5 mg twice daily minoxidil finasteride    Allergies noted and unchanged    Social history no tobacco alcohol    Some prior family history Sister with factor V Leiden and DVT PE father hyperlipidemia on Repatha    Prevention limited exercise some prior evaluations reviewed blood work reviewed    Review of Systems    Objective   /90   Pulse 93   Ht 1.626 m (5' 4\")   Wt 72.6 kg (160 lb)   SpO2 96%   BMI 27.46 kg/m²     Physical Exam  Vital signs noted alert and oriented x 3 NCAT PERRLA EOMI nares without discharge OP benign TM normal bilateral EAC clear bilateral no AC nodes no JVD or bruit no thyromegaly chest clear to auscultation and percussion CV regular rate and rhythm S1-S2 murmur gallop or rub without  abdomen soft nontender normal active bowel sounds no rebound or guarding no HSM LS spine normal curvature negative straight leg raise negative logroll negative SI joint tenderness extremities no clubbing cyanosis or edema normal distal pulses DTR 2+  Assessment/Plan    impression General medical examination hyperlipidemia family history of clotting disorder other diagnoses fatigue sacroiliitis  Prevention check Chem-7 advised on glucose potassium and kidney function check CBC advised on blood count check lipid panel advised on cholesterol profile check TSH advised on thyroid regarding weight loss he has done through diet and exercise and just generally feeling well and better with the exercise amount that he is doing " follows up with the psychiatrist still concerned about his sacroiliac joint area may consider physical therapy for that based on before and then recheck based on above more regularly TT 50 cc 26     Physical therapy?  Versus back to Ortho for sacroiliitis

## 2025-05-22 LAB
ANION GAP SERPL CALCULATED.4IONS-SCNC: 11 MMOL/L (CALC) (ref 7–17)
BUN SERPL-MCNC: 14 MG/DL (ref 7–25)
BUN/CREAT SERPL: NORMAL (CALC) (ref 6–22)
CALCIUM SERPL-MCNC: 9.8 MG/DL (ref 8.6–10.3)
CHLORIDE SERPL-SCNC: 99 MMOL/L (ref 98–110)
CHOLEST SERPL-MCNC: 235 MG/DL
CHOLEST/HDLC SERPL: 6.5 (CALC)
CO2 SERPL-SCNC: 29 MMOL/L (ref 20–32)
CREAT SERPL-MCNC: 0.75 MG/DL (ref 0.6–1.26)
EGFRCR SERPLBLD CKD-EPI 2021: 123 ML/MIN/1.73M2
ERYTHROCYTE [DISTWIDTH] IN BLOOD BY AUTOMATED COUNT: 11.5 % (ref 11–15)
GLUCOSE SERPL-MCNC: 76 MG/DL (ref 65–139)
HCT VFR BLD AUTO: 45.4 % (ref 38.5–50)
HDLC SERPL-MCNC: 36 MG/DL
HGB BLD-MCNC: 15.4 G/DL (ref 13.2–17.1)
LDLC SERPL CALC-MCNC: 164 MG/DL (CALC)
MCH RBC QN AUTO: 32 PG (ref 27–33)
MCHC RBC AUTO-ENTMCNC: 33.9 G/DL (ref 32–36)
MCV RBC AUTO: 94.2 FL (ref 80–100)
NONHDLC SERPL-MCNC: 199 MG/DL (CALC)
PLATELET # BLD AUTO: 324 THOUSAND/UL (ref 140–400)
PMV BLD REES-ECKER: 10.4 FL (ref 7.5–12.5)
POTASSIUM SERPL-SCNC: 4.3 MMOL/L (ref 3.5–5.3)
RBC # BLD AUTO: 4.82 MILLION/UL (ref 4.2–5.8)
SODIUM SERPL-SCNC: 139 MMOL/L (ref 135–146)
TRIGL SERPL-MCNC: 194 MG/DL
TSH SERPL-ACNC: 0.87 MIU/L (ref 0.4–4.5)
WBC # BLD AUTO: 8.6 THOUSAND/UL (ref 3.8–10.8)

## 2025-06-23 ASSESSMENT — DERMATOLOGY QUALITY OF LIFE (QOL) ASSESSMENT
DATE THE QUALITY-OF-LIFE ASSESSMENT WAS COMPLETED: 67380
RATE HOW BOTHERED YOU ARE BY SYMPTOMS OF YOUR SKIN PROBLEM (EG, ITCHING, STINGING BURNING, HURTING OR SKIN IRRITATION): 3
RATE HOW BOTHERED YOU ARE BY EFFECTS OF YOUR SKIN PROBLEMS ON YOUR ACTIVITIES (EG, GOING OUT, ACCOMPLISHING WHAT YOU WANT, WORK ACTIVITIES OR YOUR RELATIONSHIPS WITH OTHERS): 2
ARE THERE EXCLUSIONS OR EXCEPTIONS FOR THE QUALITY OF LIFE ASSESSMENT: NO
RATE HOW BOTHERED YOU ARE BY EFFECTS OF YOUR SKIN PROBLEMS ON YOUR ACTIVITIES (EG, GOING OUT, ACCOMPLISHING WHAT YOU WANT, WORK ACTIVITIES OR YOUR RELATIONSHIPS WITH OTHERS): 2
WHAT SINGLE SKIN CONDITION LISTED BELOW IS THE PATIENT ANSWERING THE QUALITY-OF-LIFE ASSESSMENT QUESTIONS ABOUT: NONE OF THE ABOVE
WHAT SINGLE SKIN CONDITION LISTED BELOW IS THE PATIENT ANSWERING THE QUALITY-OF-LIFE ASSESSMENT QUESTIONS ABOUT: NONE OF THE ABOVE
RATE HOW BOTHERED YOU ARE BY SYMPTOMS OF YOUR SKIN PROBLEM (EG, ITCHING, STINGING BURNING, HURTING OR SKIN IRRITATION): 3
RATE HOW EMOTIONALLY BOTHERED YOU ARE BY YOUR SKIN PROBLEM (FOR EXAMPLE, WORRY, EMBARRASSMENT, FRUSTRATION): 1
RATE HOW EMOTIONALLY BOTHERED YOU ARE BY YOUR SKIN PROBLEM (FOR EXAMPLE, WORRY, EMBARRASSMENT, FRUSTRATION): 1

## 2025-06-23 ASSESSMENT — PATIENT GLOBAL ASSESSMENT (PGA): WHAT IS THE PGA: PATIENT GLOBAL ASSESSMENT:  2 - MILD

## 2025-06-24 ENCOUNTER — APPOINTMENT (OUTPATIENT)
Dept: DERMATOLOGY | Facility: CLINIC | Age: 30
End: 2025-06-24
Payer: COMMERCIAL

## 2025-06-24 DIAGNOSIS — R20.2 NOTALGIA PARESTHETICA: ICD-10-CM

## 2025-06-24 DIAGNOSIS — L72.3 INFLAMED EPIDERMOID CYST OF SKIN: Primary | ICD-10-CM

## 2025-06-24 PROCEDURE — 11900 INJECT SKIN LESIONS </W 7: CPT | Performed by: STUDENT IN AN ORGANIZED HEALTH CARE EDUCATION/TRAINING PROGRAM

## 2025-06-24 PROCEDURE — 99213 OFFICE O/P EST LOW 20 MIN: CPT | Performed by: STUDENT IN AN ORGANIZED HEALTH CARE EDUCATION/TRAINING PROGRAM

## 2025-06-24 NOTE — Clinical Note
Dx: Sciatica of right side (M54.31)     Insurance   HMO         POC# of visits: 12          Authorized  # visits by insurance  and  Expiration date : 8 expires 9/1/20  Authorizing Physician: Dr. Janelle Walton  Next MD visit: none  Fall Risk: standard         P Reports prior rupture  Within the past 2 weeks seems to have gotten more inflamed   symptoms on R thigh area with lumbar motions for ease of functional transfers :gone  ADDITIONAL:5. Pt will demonstrate improved scapula stabilizers on R side to half a grade or better for improved posture    Plan: cont per POC, assess effects of MFR  Date: 40 min  Total Treatment Time: 40 min

## 2025-06-24 NOTE — PROGRESS NOTES
"Subjective     Royce Sullivan is a 30 y.o. male who presents for the following: Cyst (On back, has been itching and doubled in size in the last two weeks. It ruptured last year during the summer. ).     Also wants me to check his back for \"nerve itch\"    Intake Questions  Do you have any new or changing Lesions?: (Patient-Rptd) (P) Yes  Where are these new or changing lesion(s) located?: (Patient-Rptd) (P) Back of neck, 3-4 inches below hairline on the right side.  For patients coming in for a Follow-up Visit:  Have there been any changes in your health since your last visit?: (Patient-Rptd) (P) No  Are you an organ transplant recipient?: (Patient-Rptd) (P) No  Have you had or do you have a Staph Infection?: (Patient-Rptd) (P) No  Have you had or do you have Vacular Disease?: (Patient-Rptd) (P) No  Do you use sunscreen?: (Patient-Rptd) (P) Daily  Do you use a tanning bed?: (Patient-Rptd) (P) No    Review of Systems:  No other skin or systemic complaints other than what is documented elsewhere in the note.    The following portions of the chart were reviewed this encounter and updated as appropriate:          Skin Cancer History  Biopsy Log Book  No skin cancers from Specimen Tracking.    Additional History      Specialty Problems          Dermatology Problems    Dermatitis, unspecified    Neoplasm of uncertain behavior of skin    Nonscarring hair loss, unspecified    Other atopic dermatitis    Other benign neoplasm of skin of left lower limb, including hip    Other seborrheic dermatitis    Plantar wart    Prurigo nodularis    Rash and other nonspecific skin eruption    Unspecified contact dermatitis due to plants, except food    Alopecia        Objective   Well appearing patient in no apparent distress; mood and affect are within normal limits.    A focused skin examination was performed. All findings within normal limits unless otherwise noted below.    Assessment/Plan   Skin Exam  1. INFLAMED EPIDERMOID CYST OF " SKIN  Neck - Posterior  Reports prior rupture  Within the past 2 weeks seems to have gotten more inflamed  Related Medications  triamcinolone acetonide (Kenalog) injection 10 mg    2. NOTALGIA PARESTHETICA  Right Lower Back  Can use capsaicin cream 0.033% qid for several weeks and decrease gradually as tolerated

## 2025-07-10 ENCOUNTER — APPOINTMENT (OUTPATIENT)
Dept: OPHTHALMOLOGY | Facility: CLINIC | Age: 30
End: 2025-07-10
Payer: COMMERCIAL

## 2025-07-10 DIAGNOSIS — H52.13 BILATERAL MYOPIA: Primary | ICD-10-CM

## 2025-07-10 PROCEDURE — FLVLG CONTACT LENS EVAL - LEVEL 2 (SP): Performed by: OPTOMETRIST

## 2025-07-10 PROCEDURE — 92004 COMPRE OPH EXAM NEW PT 1/>: CPT | Performed by: OPTOMETRIST

## 2025-07-10 PROCEDURE — 92015 DETERMINE REFRACTIVE STATE: CPT | Performed by: OPTOMETRIST

## 2025-07-10 ASSESSMENT — CONF VISUAL FIELD
METHOD: COUNTING FINGERS
OS_INFERIOR_TEMPORAL_RESTRICTION: 0
OD_SUPERIOR_TEMPORAL_RESTRICTION: 0
OS_NORMAL: 1
OD_INFERIOR_NASAL_RESTRICTION: 0
OD_SUPERIOR_NASAL_RESTRICTION: 0
OD_NORMAL: 1
OS_SUPERIOR_NASAL_RESTRICTION: 0
OS_SUPERIOR_TEMPORAL_RESTRICTION: 0
OD_INFERIOR_TEMPORAL_RESTRICTION: 0
OS_INFERIOR_NASAL_RESTRICTION: 0

## 2025-07-10 ASSESSMENT — VISUAL ACUITY
OD_CC: 20/20
OS_CC: 20/20
METHOD: SNELLEN - LINEAR
CORRECTION_TYPE: GLASSES

## 2025-07-10 ASSESSMENT — CUP TO DISC RATIO
OD_RATIO: 0.25
OS_RATIO: 0.25

## 2025-07-10 ASSESSMENT — REFRACTION_MANIFEST
OD_AXIS: 005
OD_SPHERE: -2.00
OS_SPHERE: -2.50
OD_CYLINDER: SPHERE
METHOD_AUTOREFRACTION: 1
OS_CYLINDER: SPHERE
OS_SPHERE: -2.50
OD_CYLINDER: -0.25
OD_SPHERE: -2.00
OS_CYLINDER: SPHERE

## 2025-07-10 ASSESSMENT — REFRACTION_CURRENTRX
OS_BRAND: PRECISION 1
OD_BASECURVE: 8.3
OD_DIAMETER: 14.2
OS_SPHERE: -2.50
OS_BASECURVE: 8.3
OS_DIAMETER: 14.2
OD_BRAND: PRECISION 1
OD_SPHERE: -2.00

## 2025-07-10 ASSESSMENT — ENCOUNTER SYMPTOMS
CONSTITUTIONAL NEGATIVE: 0
EYES NEGATIVE: 1
NEUROLOGICAL NEGATIVE: 0
ALLERGIC/IMMUNOLOGIC NEGATIVE: 0
CARDIOVASCULAR NEGATIVE: 0
RESPIRATORY NEGATIVE: 0
PSYCHIATRIC NEGATIVE: 0
GASTROINTESTINAL NEGATIVE: 0
MUSCULOSKELETAL NEGATIVE: 0
ENDOCRINE NEGATIVE: 0
HEMATOLOGIC/LYMPHATIC NEGATIVE: 0

## 2025-07-10 ASSESSMENT — REFRACTION_WEARINGRX
OD_CYLINDER: SPHERE
OS_CYLINDER: SPHERE
OS_SPHERE: -2.50
SPECS_TYPE: SVL
OD_SPHERE: -2.00

## 2025-07-10 ASSESSMENT — TONOMETRY
OS_IOP_MMHG: 21
IOP_METHOD: GOLDMANN APPLANATION
OD_IOP_MMHG: 21

## 2025-07-10 ASSESSMENT — EXTERNAL EXAM - RIGHT EYE: OD_EXAM: NORMAL

## 2025-07-10 ASSESSMENT — EXTERNAL EXAM - LEFT EYE: OS_EXAM: NORMAL

## 2025-07-10 ASSESSMENT — SLIT LAMP EXAM - LIDS
COMMENTS: GOOD POSITION
COMMENTS: GOOD POSITION

## 2025-07-10 NOTE — ASSESSMENT & PLAN NOTE
Stable Rx. Anterior and posterior ocular health WNL OU.   Good comfort, vision and fit with:  Precision 1   OD: -2.00D sphere  OS: -2.50D sphere  Pt educated on findings. Discussed CL hygiene and importance of following appropriate CL wear and replacement schedule. Pt to trial Cls for 3-5 days then notify if happy with lenses to finalize CLRx. Released SpecRx today. Monitor annually. Pt voiced understanding.

## 2025-07-10 NOTE — PROGRESS NOTES
Assessment/Plan   Problem List Items Addressed This Visit          Eye/Vision problems    Bilateral myopia - Primary    Stable Rx. Anterior and posterior ocular health WNL OU.   Good comfort, vision and fit with:  Precision 1   OD: -2.00D sphere  OS: -2.50D sphere  Pt educated on findings. Discussed CL hygiene and importance of following appropriate CL wear and replacement schedule. Pt to trial Cls for 3-5 days then notify if happy with lenses to finalize CLRx. Released SpecRx today. Monitor annually. Pt voiced understanding.

## 2025-07-21 NOTE — PROGRESS NOTES
New Consult/New Patient Note    7/22/2025   No ref. provider found    Assessment: Very pleasant 30 y M who presents with left sided SI pain. This is limited to the left SI without lumbar pain or radiation. The pain is mild-moderate, intermittent and limits patient from pursuing exercise other than walking.  - Left SI joint dysfunction vs lumbar spondylosis  - Component of myofacial pain    PLAN:  1)  Imaging/Diagnostic Studies:  Lumbar XR ordered.  Prior lumbar MRI personally reviewed and interpreted with mild degenerative disc disease at L5/S1, no severe central canal or foraminal narrowing appreciated.    2)  Therapy/Rehabilitation: PT ordered   3)  Pharmacological Management: Meloxicam and Robaxin prescribed  4)  Spine/Surgical Interventions: None warranted at this time  5)  Alternative Treatments: May consider alternative treatment options in the future including manipulation (chiropractor versus osteopathic) and/or acupuncture if patient does not obtain optimal relief with initial treatment plan.  6)  Consultations: Physical therapy  7)  Follow -up: 4-6 weeks or PRN if symptoms worsen/do not improve.   8)  Future treatment considerations: Lumbar MRI may be warranted if pt fails conservative treatment.    Patient advised of the difference between hurt and harm and advised to continue with all normal activities and exercises. Patient verbalized understanding of the above plan and was happy with the care provided.      The above clinical summary has been dictated with voice recognition software. It has not been proofread for grammatical errors, typographical mistakes, or other semantic inconsistencies.    Thank you for visiting our office today. It was our pleasure to take part in your healthcare.     Do not hesitate to call with any questions regarding your plan of care after leaving at [phone]    To clinicians, thank you very much for this kind referral. It is a privilege to partner with you in the care  of your patients. My office would be delighted to assist you with any further consultations or with questions regarding the plan of care outlined. Do not hesitate to call the office or contact me directly.     Pt seen with Resident  Santiago Rojo DO  PM&R PGY-2    Sincerely,    DAQUAN Gimenez MD  , Physical Medicine and Rehabilitation, Orthopedic Spine  [facility] [employer]  [facility] [place]     Royce Sullivan   is a 30 y.o. male who presents with left SI pain. He noticed this after resuming a bodyweight exercise program. The pain is limited to his left SI. He rarely if ever takes ibuprofen as he preferred stretching, rest and weight loss. However, this has not provided him relief.    Of note patient mentions his dermatologist thinks he may have nerve pain coming from his lower thoracic region.    Location: Left SI  Radiation: neg  Quality: Dull ache. Current 2/10, at its worst 6/10.  Exacerbated by overuse, bad posture  Relieved by laying down  Onset, traumatic event: 09/2024, started doing some exercises for a week and it never went away.  Has tried:  stretching, loss weight, Ibuprofen, rest, movement.        Valsalva sign is neg   Grocery cart sign is neg   Smoker:  no   Does not wake them at night  Litigation: no    Patient denies bowel/bladder incontinence, denies fever, denies unintentional weight loss, denies clumsiness of hands, feet, or dropping things.  Denies any constitutional or myelopathic symptomatology.      PREVIOUS TREATMENTS  IN THE LAST SIX MONTHS     Active conservative therapy  in the last six months (see below)              1. Physical therapy:    No                                                                                 2. Home exercise program after PT:      No                                         3. A physician supervised home exercise program (HEP):   No              4. Chiropractic Care:              No                                                         Passive conservative therapy  in the last six months (see below)              1. NSAIDS:       None                                                                                                    2. Prescription pain medication:      None                                                        3. Acupuncture:     None                                                                                       4. Tens unit: None     Assistive Devices: None    Work status: desk job      ROS: Other than listed in HPI, PMHX below, and intake paperwork including a 30 point patient-recorded review of symptoms which was personally reviewed and inclusive of no history of unintentional weight loss, change in appetite, significant malaise, fevers, chills, or change in bowel/bladder, shortness of breath, or chest pain.    I have confirmed and edited as necessary Past Medical, Past Surgical, Family, Social History and ROS as obtained by others. These were also obtained on new patient forms.      PHYSICAL EXAM:   GENERAL APPEARANCE:  Well nourished, well developed, and no apparent distress.  NEURO PSYCH: Patient oriented to person, place, Mood pleasant. Benign affect.  MUSCULOSKELETAL and NEUROLOGICAL       VISUAL INSPECTION           LUMBAR: WNL  SPINE ROM:   LUMBAR ROM: WNL      PALPATION:           SPINOUS PROCESS: Nontender           PARASPINALS: Nontender  FACET LOADING: Negative  MUSCLE BULK: Normal and symmetrical in bilateral lower extremities.  MUSCLE TONE: Normal  MOTOR: 5/5 in all muscle groups tested in bilateral lower extremities   SENSORY: Normal sensory exam to light touch  GAIT: Normal.  Able to go up and heels and toes with no sig. weakness.  No sig. balance deficit appreciated  REFLEXES: +2 to bilateral lower extremities  LONG TRACT SIGNS: No clonus, Neg Hoffmans.  STRAIGHT LEG TEST: Negative   PERIPHERAL JOINT ROM:   HIP ROM: Full bilaterally  ALDEN/Thigh  Thrust/Compression/Roslyn Finger:  Negative bilaterally   Hip Exam including thigh thrust and LOG ROLL: Negative bilaterally    DATA REVIEW:   The below imaging studies were personally reviewed and discussed with the patient.    Medical Decision Making:  The above note constitutes a Moderate to High level of medical decision making based on past data and imaging review, new and chronic symptoms with exacerbation, change in weakness or sensation, new imaging and diagnostic studies ordered, discussion of potential interventional or surgical treatment options, acute or chronic pain that may pose a threat to bodily function.    Medical History[1]    Medication Documentation Review Audit       Reviewed by Alvarez Cordoba OD (Optometrist) on 07/10/25 at 0923      Medication Order Taking? Sig Documenting Provider Last Dose Status   amphetamine-dextroamphetamine (Adderall) 5 mg tablet [id number] Yes Take 2 tablets (10 mg) by mouth 2 times a day. Historical Provider, MD Taking Active   betamethasone valerate (Valisone) 0.1 % lotion [id number] No 1 Application. Historical Provider, MD Taking Active   betamethasone, augmented, (Diprolene) 0.05 % lotion [id number] No 1 Application. Historical MD Pasquale Not Taking Active   clobetasol (Temovate) 0.05 % cream [id number] Yes 1 Application. Historical Provider, MD Taking Active   cloNIDine (Catapres) 0.1 mg tablet [id number] No TAKE 1/2 TO 1 TABLET BY MOUTH EVERY NIGHT AT BEDTIME FOR INSOMNIA OR ANXIETY Historical MD Pasquale Not Taking Active   cloNIDine (Catapres-TTS) 0.1 mg/24 hr patch [id number] No Place on the skin. Historical MD Pasquale Not Taking Active   dutasteride (Avodart) 0.5 mg capsule [id number] Yes Take 1 capsule (0.5 mg) by mouth once daily. Flash Varghese MD  Active   escitalopram (Lexapro) 20 mg tablet [id number] No Take 1 tablet (20 mg) by mouth once daily in the morning. Valente Giordano MD Taking Active   escitalopram (Lexapro) 5 mg tablet [id number] Yes Take  by mouth. Historical Provider, MD Not Taking Active   finasteride (Propecia) 1 mg tablet [id number] No 1 tablet (1 mg). Historical Provider, MD Taking Active   minocycline 50 mg capsule [id number] No 0 Tablet Historical Provider, MD Not Taking Active   minoxidil (Loniten) 2.5 mg tablet [id number] Yes Take 1 tablet (2.5 mg) by mouth once daily. Flash Varghese MD  Active   propranolol (Inderal) 10 mg tablet [id number] No Take by mouth. Historical Provider, MD Not Taking Active   propranolol (Inderal) 20 mg tablet [id number] No Take 1 tablet (20 mg) by mouth 2 times a day. Historical Provider, MD Not Taking Active   triamcinolone (Kenalog) 0.1 % cream [id number] No 1 Application. Historical Provider, MD Taking Active                    RX Allergies[2]    Social History     Socioeconomic History    Marital status: Single     Spouse name: Not on file    Number of children: Not on file    Years of education: Not on file    Highest education level: Not on file   Occupational History    Not on file   Tobacco Use    Smoking status: Never    Smokeless tobacco: Never   Substance and Sexual Activity    Alcohol use: Not Currently     Comment: Currently may only have alcohol every few months    Drug use: Yes     Frequency: 7.0 times per week     Types: Marijuana     Comment: Daily user of medical cannabis, vaped not combusted.    Sexual activity: Not Currently   Other Topics Concern    Not on file   Social History Narrative    Not on file     Social Drivers of Health     Financial Resource Strain: Not on file   Food Insecurity: Not on file   Transportation Needs: Not on file   Physical Activity: Not on file   Stress: Not on file   Social Connections: Not on file   Intimate Partner Violence: Not on file   Housing Stability: Not on file       Surgical History[3]         [1]   Past Medical History:  Diagnosis Date    Anesthesia of skin 02/26/2021    Numbness of left hand    Compression of brain (Multi) 02/26/2021    Chiari I malformation     Cyst and mucocele of nose and nasal sinus 05/28/2021    Cyst of nasal sinus    Lumbosacral disc disease May 27, 2021    Personal history of traumatic brain injury 02/26/2021    History of concussion    Rotator cuff syndrome 2008 maybe   [2] No Known Allergies  [3] No past surgical history on file.

## 2025-07-22 ENCOUNTER — APPOINTMENT (OUTPATIENT)
Dept: ORTHOPEDIC SURGERY | Facility: CLINIC | Age: 30
End: 2025-07-22
Payer: COMMERCIAL

## 2025-07-22 ENCOUNTER — HOSPITAL ENCOUNTER (OUTPATIENT)
Dept: RADIOLOGY | Facility: CLINIC | Age: 30
Discharge: HOME | End: 2025-07-22
Payer: COMMERCIAL

## 2025-07-22 DIAGNOSIS — M53.3 SI (SACROILIAC) JOINT DYSFUNCTION: ICD-10-CM

## 2025-07-22 DIAGNOSIS — M47.816 LUMBAR SPONDYLOSIS: Primary | ICD-10-CM

## 2025-07-22 DIAGNOSIS — M62.830 BACK MUSCLE SPASM: ICD-10-CM

## 2025-07-22 DIAGNOSIS — M47.816 LUMBAR SPONDYLOSIS: ICD-10-CM

## 2025-07-22 PROCEDURE — 72110 X-RAY EXAM L-2 SPINE 4/>VWS: CPT | Performed by: STUDENT IN AN ORGANIZED HEALTH CARE EDUCATION/TRAINING PROGRAM

## 2025-07-22 PROCEDURE — 99204 OFFICE O/P NEW MOD 45 MIN: CPT | Performed by: PHYSICAL MEDICINE & REHABILITATION

## 2025-07-22 PROCEDURE — 72120 X-RAY BEND ONLY L-S SPINE: CPT

## 2025-07-22 RX ORDER — MELOXICAM 15 MG/1
15 TABLET ORAL DAILY PRN
Qty: 30 TABLET | Refills: 1 | Status: SHIPPED | OUTPATIENT
Start: 2025-07-22 | End: 2025-08-21

## 2025-07-22 RX ORDER — METHOCARBAMOL 500 MG/1
TABLET, FILM COATED ORAL
Qty: 60 TABLET | Refills: 1 | Status: SHIPPED | OUTPATIENT
Start: 2025-07-22

## 2025-07-22 SDOH — SOCIAL STABILITY: SOCIAL NETWORK: SOCIAL ACTIVITY:: 2

## 2025-08-01 ENCOUNTER — DOCUMENTATION (OUTPATIENT)
Dept: OPHTHALMOLOGY | Facility: CLINIC | Age: 30
End: 2025-08-01
Payer: COMMERCIAL

## 2025-08-01 ASSESSMENT — REFRACTION_CURRENTRX
OD_SPHERE: -2.00
OS_SPHERE: -2.50
OD_BASECURVE: 8.5
OS_DIAMETER: 14.2
OS_BRAND: ACUVUE MOIST 1 DAY
OD_DIAMETER: 14.2
OS_BASECURVE: 8.5
OD_BRAND: ACUVUE MOIST 1 DAY

## 2025-08-01 NOTE — Clinical Note
Both eyes (OU) Contact Lens Order  Quantity: 1 Package: 5 PK TRIAL Appointment needed? No Medically necessary? No Ship To: Home Additional instructions: Ship trials for 1 day acuvue moist for patient.

## 2025-08-11 ENCOUNTER — EVALUATION (OUTPATIENT)
Dept: PHYSICAL THERAPY | Facility: CLINIC | Age: 30
End: 2025-08-11
Payer: COMMERCIAL

## 2025-08-11 DIAGNOSIS — M54.9 BACK PAIN: Primary | ICD-10-CM

## 2025-08-11 PROCEDURE — 97110 THERAPEUTIC EXERCISES: CPT | Mod: GP

## 2025-08-11 PROCEDURE — 97161 PT EVAL LOW COMPLEX 20 MIN: CPT | Mod: GP

## 2025-08-12 ENCOUNTER — OFFICE VISIT (OUTPATIENT)
Dept: DERMATOLOGY | Facility: CLINIC | Age: 30
End: 2025-08-12
Payer: COMMERCIAL

## 2025-08-12 DIAGNOSIS — L72.3 INFLAMED EPIDERMOID CYST OF SKIN: Primary | ICD-10-CM

## 2025-08-12 PROCEDURE — 11900 INJECT SKIN LESIONS </W 7: CPT | Performed by: STUDENT IN AN ORGANIZED HEALTH CARE EDUCATION/TRAINING PROGRAM

## 2025-08-12 RX ORDER — TRIAMCINOLONE ACETONIDE 10 MG/ML
10 INJECTION, SUSPENSION INTRA-ARTICULAR; INTRALESIONAL ONCE
Status: COMPLETED | OUTPATIENT
Start: 2025-08-12 | End: 2025-08-12

## 2025-08-12 RX ADMIN — TRIAMCINOLONE ACETONIDE 10 MG: 10 INJECTION, SUSPENSION INTRA-ARTICULAR; INTRALESIONAL at 09:49

## 2025-08-21 ENCOUNTER — APPOINTMENT (OUTPATIENT)
Dept: PHYSICAL THERAPY | Facility: CLINIC | Age: 30
End: 2025-08-21
Payer: COMMERCIAL

## 2025-08-28 ENCOUNTER — APPOINTMENT (OUTPATIENT)
Dept: PHYSICAL THERAPY | Facility: CLINIC | Age: 30
End: 2025-08-28
Payer: COMMERCIAL

## 2025-09-16 ENCOUNTER — APPOINTMENT (OUTPATIENT)
Dept: ORTHOPEDIC SURGERY | Facility: CLINIC | Age: 30
End: 2025-09-16
Payer: COMMERCIAL

## 2025-09-23 ENCOUNTER — APPOINTMENT (OUTPATIENT)
Dept: ORTHOPEDIC SURGERY | Facility: CLINIC | Age: 30
End: 2025-09-23
Payer: COMMERCIAL

## 2026-05-27 ENCOUNTER — APPOINTMENT (OUTPATIENT)
Dept: PRIMARY CARE | Facility: CLINIC | Age: 31
End: 2026-05-27
Payer: COMMERCIAL